# Patient Record
Sex: FEMALE | URBAN - METROPOLITAN AREA
[De-identification: names, ages, dates, MRNs, and addresses within clinical notes are randomized per-mention and may not be internally consistent; named-entity substitution may affect disease eponyms.]

---

## 2021-06-23 ENCOUNTER — WEB ENCOUNTER (OUTPATIENT)
Dept: URBAN - METROPOLITAN AREA CLINIC 13 | Facility: CLINIC | Age: 46
End: 2021-06-23

## 2021-07-16 ENCOUNTER — LAB OUTSIDE AN ENCOUNTER (OUTPATIENT)
Dept: URBAN - METROPOLITAN AREA CLINIC 19 | Facility: CLINIC | Age: 46
End: 2021-07-16

## 2021-07-16 ENCOUNTER — WEB ENCOUNTER (OUTPATIENT)
Dept: URBAN - METROPOLITAN AREA CLINIC 19 | Facility: CLINIC | Age: 46
End: 2021-07-16

## 2021-07-16 ENCOUNTER — DASHBOARD ENCOUNTERS (OUTPATIENT)
Age: 46
End: 2021-07-16

## 2021-07-16 ENCOUNTER — OFFICE VISIT (OUTPATIENT)
Dept: URBAN - METROPOLITAN AREA CLINIC 19 | Facility: CLINIC | Age: 46
End: 2021-07-16
Payer: COMMERCIAL

## 2021-07-16 VITALS
TEMPERATURE: 98.9 F | DIASTOLIC BLOOD PRESSURE: 70 MMHG | BODY MASS INDEX: 27.32 KG/M2 | HEART RATE: 98 BPM | SYSTOLIC BLOOD PRESSURE: 128 MMHG | WEIGHT: 170 LBS | HEIGHT: 66 IN

## 2021-07-16 DIAGNOSIS — R19.7 DIARRHEA, UNSPECIFIED TYPE: ICD-10-CM

## 2021-07-16 DIAGNOSIS — K21.9 GASTROESOPHAGEAL REFLUX DISEASE, UNSPECIFIED WHETHER ESOPHAGITIS PRESENT: ICD-10-CM

## 2021-07-16 DIAGNOSIS — R63.4 WEIGHT LOSS: ICD-10-CM

## 2021-07-16 DIAGNOSIS — R10.13 EPIGASTRIC ABDOMINAL PAIN: ICD-10-CM

## 2021-07-16 PROCEDURE — 99244 OFF/OP CNSLTJ NEW/EST MOD 40: CPT | Performed by: INTERNAL MEDICINE

## 2021-07-16 PROCEDURE — 99204 OFFICE O/P NEW MOD 45 MIN: CPT | Performed by: INTERNAL MEDICINE

## 2021-07-16 RX ORDER — PANTOPRAZOLE SODIUM 20 MG/1
1 TABLET TABLET, DELAYED RELEASE ORAL TWICE A DAY
Qty: 180 TABLET | Refills: 1 | OUTPATIENT
Start: 2021-07-16

## 2021-07-16 RX ORDER — SODIUM, POTASSIUM,MAG SULFATES 17.5-3.13G
354ML SOLUTION, RECONSTITUTED, ORAL ORAL
Qty: 354 MILLILITER | Refills: 0 | OUTPATIENT
Start: 2021-07-16 | End: 2021-07-17

## 2021-07-16 RX ORDER — HYOSCYAMINE SULFATE 0.12 MG/1
1 TABLET AS NEEDED TABLET ORAL
Qty: 20 | Refills: 0 | OUTPATIENT
Start: 2021-07-16 | End: 2021-07-21

## 2021-07-16 NOTE — HPI-ZZZ
The patient was referred by Dr. Jose Ca for weight loss, diarrhea, and GERD.   A copy of this document is being forwarded to the referring provider.  Ms. Burroughs is a 46 year-old female who presents today for weight loss, loose stool, cramps, and acid reflux. She reports she has not been losing weight intentionally. She reports no change in diet, no loss of appetite, and she does not exercise. She reports 60 lb weight loss in 2 months. She was 220lb and has gotten down to 165. She is eating normally. She also has loose stool. She has one loose bowel movement daily. This has been occurring for 3 years. There is mucus, no blood. She has not tried taking any medication. CBC, TSH normal- 6/4/21. She also reports cramps. There are in the morning after she eats breakfast and will last for 20 mins. She does not have to have a bowel movement. She also reports she has has acid reflux for years. She takes tums as needed and drinks milk if it is bad. It happens every day and is worse at night. Already sleeping elevated. She can feel acid coming up into her esophagus and chest pain that feels like she needs to burp but cannot. Occasional nausea. She stays away from red sauces. No previous EGD or colonoscopy. No family history of colon cancer.    No blood thinners, cardiac disease, kidney disease, diabetes, or home O2.

## 2021-07-19 LAB
A/G RATIO: 1.8
ALBUMIN: 4.5
ALKALINE PHOSPHATASE: 62
ALT (SGPT): 11
AST (SGOT): 12
BILIRUBIN, TOTAL: 0.4
BUN/CREATININE RATIO: 14
BUN: 11
C-REACTIVE PROTEIN, QUANT: <1
CALCIUM: 9.4
CARBON DIOXIDE, TOTAL: 21
CHLORIDE: 104
CREATININE: 0.8
EGFR IF AFRICN AM: 104
EGFR IF NONAFRICN AM: 90
ENDOMYSIAL ANTIBODY IGA: NEGATIVE
GLOBULIN, TOTAL: 2.5
GLUCOSE: 96
IMMUNOGLOBULIN A, QN, SERUM: 128
LIPASE: 33
POTASSIUM: 4.4
PROTEIN, TOTAL: 7
SODIUM: 138
T-TRANSGLUTAMINASE (TTG) IGA: <2

## 2021-07-21 ENCOUNTER — WEB ENCOUNTER (OUTPATIENT)
Dept: URBAN - METROPOLITAN AREA CLINIC 19 | Facility: CLINIC | Age: 46
End: 2021-07-21

## 2021-07-25 LAB
CALPROTECTIN, FECAL: <16
FATS, NEUTRAL: NORMAL
FATS, TOTAL: NORMAL
Lab: (no result)
PANCREATIC ELASTASE, FECAL: >500
WHITE BLOOD CELLS (WBC), STOOL: (no result)

## 2021-08-17 PROBLEM — 79922009: Status: ACTIVE | Noted: 2021-07-16

## 2021-08-17 PROBLEM — 89362005: Status: ACTIVE | Noted: 2021-07-16

## 2021-08-17 PROBLEM — 235595009: Status: ACTIVE | Noted: 2021-07-16

## 2021-08-17 PROBLEM — 62315008: Status: ACTIVE | Noted: 2021-07-16

## 2021-08-25 ENCOUNTER — OFFICE VISIT (OUTPATIENT)
Dept: URBAN - METROPOLITAN AREA SURGERY CENTER 31 | Facility: SURGERY CENTER | Age: 46
End: 2021-08-25
Payer: COMMERCIAL

## 2021-08-25 DIAGNOSIS — K29.60 ADENOPAPILLOMATOSIS GASTRICA: ICD-10-CM

## 2021-08-25 DIAGNOSIS — R63.4 ABNORMAL INTENTIONAL WEIGHT LOSS: ICD-10-CM

## 2021-08-25 DIAGNOSIS — K22.8 COLUMNAR-LINED ESOPHAGUS: ICD-10-CM

## 2021-08-25 DIAGNOSIS — D12.2 ADENOMA OF ASCENDING COLON: ICD-10-CM

## 2021-08-25 DIAGNOSIS — R19.7 ACUTE DIARRHEA: ICD-10-CM

## 2021-08-25 DIAGNOSIS — R10.13 ABDOMINAL DISCOMFORT, EPIGASTRIC: ICD-10-CM

## 2021-08-25 PROCEDURE — G8907 PT DOC NO EVENTS ON DISCHARG: HCPCS | Performed by: INTERNAL MEDICINE

## 2021-08-25 PROCEDURE — 43239 EGD BIOPSY SINGLE/MULTIPLE: CPT | Performed by: INTERNAL MEDICINE

## 2021-08-25 PROCEDURE — 45380 COLONOSCOPY AND BIOPSY: CPT | Performed by: INTERNAL MEDICINE

## 2021-08-25 RX ORDER — PANTOPRAZOLE SODIUM 20 MG/1
1 TABLET TABLET, DELAYED RELEASE ORAL TWICE A DAY
Qty: 180 TABLET | Refills: 1 | Status: ACTIVE | COMMUNITY
Start: 2021-07-16

## 2021-09-08 ENCOUNTER — WEB ENCOUNTER (OUTPATIENT)
Dept: URBAN - METROPOLITAN AREA CLINIC 19 | Facility: CLINIC | Age: 46
End: 2021-09-08